# Patient Record
Sex: MALE | Race: WHITE | NOT HISPANIC OR LATINO | Employment: OTHER | URBAN - METROPOLITAN AREA
[De-identification: names, ages, dates, MRNs, and addresses within clinical notes are randomized per-mention and may not be internally consistent; named-entity substitution may affect disease eponyms.]

---

## 2023-08-27 PROBLEM — I25.10 ARTERIOSCLEROSIS OF CORONARY ARTERY: Status: ACTIVE | Noted: 2023-08-27

## 2023-08-27 PROBLEM — R79.89 OTHER SPECIFIED ABNORMAL FINDINGS OF BLOOD CHEMISTRY: Status: ACTIVE | Noted: 2023-08-27

## 2023-08-27 PROBLEM — N40.0 BENIGN PROSTATIC HYPERPLASIA: Status: ACTIVE | Noted: 2023-08-27

## 2023-08-27 PROBLEM — N18.30 STAGE 3 CHRONIC KIDNEY DISEASE (MULTI): Status: ACTIVE | Noted: 2023-08-27

## 2023-08-27 PROBLEM — I10 ESSENTIAL HYPERTENSION: Status: ACTIVE | Noted: 2023-08-27

## 2023-08-27 PROBLEM — E11.9 TYPE 2 DIABETES MELLITUS WITHOUT COMPLICATION (MULTI): Status: ACTIVE | Noted: 2023-08-27

## 2023-08-27 PROBLEM — N52.9 IMPOTENCE OF ORGANIC ORIGIN: Status: ACTIVE | Noted: 2023-08-27

## 2023-08-27 PROBLEM — E78.00 HYPERCHOLESTEROLEMIA: Status: ACTIVE | Noted: 2023-08-27

## 2023-08-27 PROBLEM — R97.20 ELEVATED PSA: Status: ACTIVE | Noted: 2023-08-27

## 2023-08-27 RX ORDER — AMLODIPINE BESYLATE 5 MG/1
5 TABLET ORAL NIGHTLY
COMMUNITY
End: 2024-02-05

## 2023-08-27 RX ORDER — OLMESARTAN MEDOXOMIL 40 MG/1
40 TABLET ORAL NIGHTLY
COMMUNITY
Start: 2020-03-24 | End: 2024-01-25

## 2023-08-27 RX ORDER — NAPROXEN SODIUM 220 MG/1
81 TABLET, FILM COATED ORAL NIGHTLY
COMMUNITY

## 2023-08-27 RX ORDER — ROSUVASTATIN CALCIUM 20 MG/1
20 TABLET, COATED ORAL NIGHTLY
COMMUNITY
Start: 2019-05-23 | End: 2024-01-25

## 2023-08-27 RX ORDER — SILDENAFIL 100 MG/1
50 TABLET, FILM COATED ORAL DAILY PRN
COMMUNITY
Start: 2018-10-15 | End: 2023-10-31 | Stop reason: ALTCHOICE

## 2023-08-27 RX ORDER — METFORMIN HYDROCHLORIDE 500 MG/1
1000 TABLET, EXTENDED RELEASE ORAL DAILY
COMMUNITY
Start: 2019-11-07 | End: 2024-01-22

## 2023-10-20 ENCOUNTER — LAB (OUTPATIENT)
Dept: LAB | Facility: LAB | Age: 68
End: 2023-10-20
Payer: MEDICARE

## 2023-10-20 DIAGNOSIS — D64.9 ANEMIA, UNSPECIFIED: ICD-10-CM

## 2023-10-20 DIAGNOSIS — E11.9 TYPE 2 DIABETES MELLITUS WITHOUT COMPLICATIONS (MULTI): ICD-10-CM

## 2023-10-20 DIAGNOSIS — I25.10 ATHEROSCLEROTIC HEART DISEASE OF NATIVE CORONARY ARTERY WITHOUT ANGINA PECTORIS: Primary | ICD-10-CM

## 2023-10-20 LAB
ALBUMIN SERPL-MCNC: 4.6 G/DL (ref 3.5–5)
ALP BLD-CCNC: 103 U/L (ref 35–125)
ALT SERPL-CCNC: 22 U/L (ref 5–40)
ANION GAP SERPL CALC-SCNC: 12 MMOL/L
AST SERPL-CCNC: 18 U/L (ref 5–40)
BILIRUB SERPL-MCNC: 0.6 MG/DL (ref 0.1–1.2)
BUN SERPL-MCNC: 25 MG/DL (ref 8–25)
CALCIUM SERPL-MCNC: 9.8 MG/DL (ref 8.5–10.4)
CHLORIDE SERPL-SCNC: 102 MMOL/L (ref 97–107)
CHOLEST SERPL-MCNC: 157 MG/DL (ref 133–200)
CHOLEST/HDLC SERPL: 3 {RATIO}
CO2 SERPL-SCNC: 26 MMOL/L (ref 24–31)
CREAT SERPL-MCNC: 1.3 MG/DL (ref 0.4–1.6)
ERYTHROCYTE [DISTWIDTH] IN BLOOD BY AUTOMATED COUNT: 12.8 % (ref 11.5–14.5)
EST. AVERAGE GLUCOSE BLD GHB EST-MCNC: 146 MG/DL
FERRITIN SERPL-MCNC: 59 NG/ML (ref 30–400)
GFR SERPL CREATININE-BSD FRML MDRD: 59 ML/MIN/1.73M*2
GLUCOSE SERPL-MCNC: 136 MG/DL (ref 65–99)
HBA1C MFR BLD: 6.7 %
HCT VFR BLD AUTO: 42.4 % (ref 41–52)
HDLC SERPL-MCNC: 53 MG/DL
HGB BLD-MCNC: 14 G/DL (ref 13.5–17.5)
IRON SERPL-MCNC: 88 UG/DL (ref 45–160)
LDLC SERPL CALC-MCNC: 66 MG/DL (ref 65–130)
MCH RBC QN AUTO: 28.8 PG (ref 26–34)
MCHC RBC AUTO-ENTMCNC: 33 G/DL (ref 32–36)
MCV RBC AUTO: 87 FL (ref 80–100)
NRBC BLD-RTO: 0 /100 WBCS (ref 0–0)
PLATELET # BLD AUTO: 221 X10*3/UL (ref 150–450)
PMV BLD AUTO: 10.7 FL (ref 7.5–11.5)
POTASSIUM SERPL-SCNC: 4.9 MMOL/L (ref 3.4–5.1)
PROT SERPL-MCNC: 7.3 G/DL (ref 5.9–7.9)
RBC # BLD AUTO: 4.86 X10*6/UL (ref 4.5–5.9)
SODIUM SERPL-SCNC: 140 MMOL/L (ref 133–145)
TRIGL SERPL-MCNC: 190 MG/DL (ref 40–150)
VIT B12 SERPL-MCNC: 413 PG/ML (ref 211–946)
WBC # BLD AUTO: 5.5 X10*3/UL (ref 4.4–11.3)

## 2023-10-20 PROCEDURE — 82607 VITAMIN B-12: CPT

## 2023-10-20 PROCEDURE — 36415 COLL VENOUS BLD VENIPUNCTURE: CPT

## 2023-10-20 PROCEDURE — 82728 ASSAY OF FERRITIN: CPT

## 2023-10-20 PROCEDURE — 80061 LIPID PANEL: CPT

## 2023-10-20 PROCEDURE — 83036 HEMOGLOBIN GLYCOSYLATED A1C: CPT

## 2023-10-20 PROCEDURE — 83540 ASSAY OF IRON: CPT

## 2023-10-20 PROCEDURE — 80053 COMPREHEN METABOLIC PANEL: CPT

## 2023-10-20 PROCEDURE — 85027 COMPLETE CBC AUTOMATED: CPT

## 2023-10-31 ENCOUNTER — OFFICE VISIT (OUTPATIENT)
Dept: PRIMARY CARE | Facility: CLINIC | Age: 68
End: 2023-10-31
Payer: MEDICARE

## 2023-10-31 VITALS
WEIGHT: 221 LBS | HEIGHT: 70 IN | SYSTOLIC BLOOD PRESSURE: 132 MMHG | OXYGEN SATURATION: 96 % | DIASTOLIC BLOOD PRESSURE: 82 MMHG | TEMPERATURE: 97.8 F | BODY MASS INDEX: 31.64 KG/M2 | HEART RATE: 68 BPM

## 2023-10-31 DIAGNOSIS — E78.00 HYPERCHOLESTEROLEMIA: ICD-10-CM

## 2023-10-31 DIAGNOSIS — I10 ESSENTIAL HYPERTENSION: ICD-10-CM

## 2023-10-31 DIAGNOSIS — I25.10 ARTERIOSCLEROSIS OF CORONARY ARTERY: Primary | ICD-10-CM

## 2023-10-31 DIAGNOSIS — E11.9 TYPE 2 DIABETES MELLITUS WITHOUT COMPLICATION, WITHOUT LONG-TERM CURRENT USE OF INSULIN (MULTI): ICD-10-CM

## 2023-10-31 DIAGNOSIS — Z85.46 HISTORY OF PROSTATE CANCER: ICD-10-CM

## 2023-10-31 PROBLEM — E66.9 OBESITY, CLASS I, BMI 30-34.9: Status: ACTIVE | Noted: 2023-08-30

## 2023-10-31 PROBLEM — C61 MALIGNANT NEOPLASM OF PROSTATE (MULTI): Status: ACTIVE | Noted: 2023-08-25

## 2023-10-31 PROBLEM — M25.562 ACUTE PAIN OF LEFT KNEE: Status: ACTIVE | Noted: 2021-08-30

## 2023-10-31 PROBLEM — H25.13 AGE-RELATED NUCLEAR CATARACT, BILATERAL: Status: ACTIVE | Noted: 2023-10-31

## 2023-10-31 PROBLEM — S83.242A TEAR OF MEDIAL MENISCUS OF LEFT KNEE, CURRENT: Status: ACTIVE | Noted: 2021-08-30

## 2023-10-31 PROBLEM — E66.811 OBESITY, CLASS I, BMI 30-34.9: Status: ACTIVE | Noted: 2023-08-30

## 2023-10-31 PROBLEM — H35.3131 NONEXUDATIVE AGE-RELATED MACULAR DEGENERATION, BILATERAL, EARLY DRY STAGE: Status: ACTIVE | Noted: 2023-10-31

## 2023-10-31 PROBLEM — H52.7 DISORDER OF REFRACTION: Status: ACTIVE | Noted: 2023-10-31

## 2023-10-31 PROCEDURE — 3079F DIAST BP 80-89 MM HG: CPT | Performed by: INTERNAL MEDICINE

## 2023-10-31 PROCEDURE — 1036F TOBACCO NON-USER: CPT | Performed by: INTERNAL MEDICINE

## 2023-10-31 PROCEDURE — 3048F LDL-C <100 MG/DL: CPT | Performed by: INTERNAL MEDICINE

## 2023-10-31 PROCEDURE — 99214 OFFICE O/P EST MOD 30 MIN: CPT | Performed by: INTERNAL MEDICINE

## 2023-10-31 PROCEDURE — 1159F MED LIST DOCD IN RCRD: CPT | Performed by: INTERNAL MEDICINE

## 2023-10-31 PROCEDURE — 1126F AMNT PAIN NOTED NONE PRSNT: CPT | Performed by: INTERNAL MEDICINE

## 2023-10-31 PROCEDURE — 3044F HG A1C LEVEL LT 7.0%: CPT | Performed by: INTERNAL MEDICINE

## 2023-10-31 PROCEDURE — 3075F SYST BP GE 130 - 139MM HG: CPT | Performed by: INTERNAL MEDICINE

## 2023-10-31 PROCEDURE — 4010F ACE/ARB THERAPY RXD/TAKEN: CPT | Performed by: INTERNAL MEDICINE

## 2023-10-31 RX ORDER — DOCUSATE SODIUM 100 MG/1
100 CAPSULE, LIQUID FILLED ORAL 2 TIMES DAILY
COMMUNITY
Start: 2023-09-12 | End: 2023-10-31 | Stop reason: ALTCHOICE

## 2023-10-31 RX ORDER — APIXABAN 2.5 MG/1
2.5 TABLET, FILM COATED ORAL 2 TIMES DAILY
COMMUNITY
Start: 2023-09-12 | End: 2023-10-03 | Stop reason: WASHOUT

## 2023-10-31 RX ORDER — TADALAFIL 5 MG/1
5 TABLET ORAL
COMMUNITY
Start: 2023-09-29

## 2023-10-31 ASSESSMENT — ENCOUNTER SYMPTOMS
PALPITATIONS: 0
OCCASIONAL FEELINGS OF UNSTEADINESS: 0
DEPRESSION: 0
SHORTNESS OF BREATH: 0

## 2023-10-31 ASSESSMENT — PATIENT HEALTH QUESTIONNAIRE - PHQ9
2. FEELING DOWN, DEPRESSED OR HOPELESS: NOT AT ALL
1. LITTLE INTEREST OR PLEASURE IN DOING THINGS: NOT AT ALL
SUM OF ALL RESPONSES TO PHQ9 QUESTIONS 1 AND 2: 0

## 2023-10-31 ASSESSMENT — PAIN SCALES - GENERAL: PAINLEVEL: 0-NO PAIN

## 2023-10-31 NOTE — PATIENT INSTRUCTIONS
Get labs 1 week follow up in spring      Diagnoses and all orders for this visit:  Arteriosclerosis of coronary artery  Comments:  LDL 66 doing well.  Essential hypertension  Comments:  BP doing well.  Hypercholesterolemia  -     Lipid Panel; Future  -     Comprehensive Metabolic Panel; Future  Type 2 diabetes mellitus without complication, without long-term current use of insulin (CMS/[facility])  Comments:  Eye exam 8/23, HGA1C 6.7%.  Orders:  -     Hemoglobin A1C; Future  -     CBC; Future  -     Albumin , Urine Random; Future  History of prostate cancer  Comments:  Recovering from prostate removal. Low dose tadalif. Drinking 48 oz water daily plus meal fluids to prevetn any dizziness.

## 2023-10-31 NOTE — PROGRESS NOTES
[facility]: [facility]  PROGRESS NOTE      Pro Fernandes is a 68 y.o. male that is presenting today for 4 mo fu.    Assessment/Plan   Diagnoses and all orders for this visit:  Arteriosclerosis of coronary artery  Comments:  LDL 66 doing well.  Essential hypertension  Comments:  BP doing well.  Hypercholesterolemia  -     Lipid Panel; Future  -     Comprehensive Metabolic Panel; Future  Type 2 diabetes mellitus without complication, without long-term current use of insulin (CMS/[facility])  Comments:  Eye exam 8/23, HGA1C 6.7%.  Orders:  -     Hemoglobin A1C; Future  -     CBC; Future  -     Albumin , Urine Random; Future  History of prostate cancer  Comments:  Recovering from prostate removal. Low dose tadalif. Drinking 48 oz water daily plus meal fluids to prevetn any dizziness.    Subjective   TN, chol, DM.Prostate removed. Doing better from 9/23. Mild incontince      Review of Systems   Respiratory:  Negative for shortness of breath.    Cardiovascular:  Negative for chest pain and palpitations.   All other systems reviewed and are negative.     Objective   Vitals:    10/31/23 1022   BP: 132/82   Pulse: 68   Temp: 36.6 °C (97.8 °F)   SpO2: 96%      Body mass index is 31.71 kg/m².  Physical Exam  Constitutional:       General: He is not in acute distress.     Appearance: He is obese.   HENT:      Head: Normocephalic and atraumatic.      Right Ear: Tympanic membrane normal.      Left Ear: Tympanic membrane normal.      Mouth/Throat:      Mouth: Mucous membranes are moist.      Pharynx: Oropharynx is clear.   Eyes:      Extraocular Movements: Extraocular movements intact.      Conjunctiva/sclera: Conjunctivae normal.      Pupils: Pupils are equal, round, and reactive to light.   Cardiovascular:      Rate and Rhythm: Normal rate and regular rhythm.   Pulmonary:      Breath sounds: Normal breath sounds.   Abdominal:      General: Bowel sounds are normal.      Palpations: Abdomen is soft. There is no  "mass.   Musculoskeletal:         General: Normal range of motion.      Cervical back: Neck supple. No tenderness.   Skin:     General: Skin is warm and dry.   Neurological:      General: No focal deficit present.      Mental Status: He is oriented to person, place, and time.      Sensory: No sensory deficit.      Motor: No weakness.       Diagnostic Results   Lab Results   Component Value Date    GLUCOSE 136 (H) 10/20/2023    CALCIUM 9.8 10/20/2023     10/20/2023    K 4.9 10/20/2023    CO2 26 10/20/2023     10/20/2023    BUN 25 10/20/2023    CREATININE 1.30 10/20/2023     Lab Results   Component Value Date    ALT 22 10/20/2023    AST 18 10/20/2023    ALKPHOS 103 10/20/2023    BILITOT 0.6 10/20/2023     Lab Results   Component Value Date    WBC 5.5 10/20/2023    HGB 14.0 10/20/2023    HCT 42.4 10/20/2023    MCV 87 10/20/2023     10/20/2023     Lab Results   Component Value Date    CHOL 157 10/20/2023    CHOL 118 (L) 10/26/2022    CHOL 149 11/04/2021     Lab Results   Component Value Date    HDL 53.0 10/20/2023    HDL 46 10/26/2022    HDL 51 11/04/2021     Lab Results   Component Value Date    LDLCALC 66 10/20/2023    LDLCALC 51 (L) 10/26/2022    LDLCALC 68 11/04/2021     Lab Results   Component Value Date    TRIG 190 (H) 10/20/2023    TRIG 106 10/26/2022    TRIG 152 (H) 11/04/2021     No components found for: \"CHOLHDL\"  Lab Results   Component Value Date    HGBA1C 6.7 (H) 10/20/2023     Other labs not included in the list above were reviewed either before or during this encounter.    History    Past Medical History:   Diagnosis Date    Arteriosclerosis of coronary artery 08/27/2023    Essential hypertension 08/27/2023    History of prostate cancer 10/31/2023    9/11/23 prostate removed, DR.Jeff Malin.    Hypercholesterolemia 08/27/2023     Past Surgical History:   Procedure Laterality Date    PROSTATE SURGERY       Family History   Problem Relation Name Age of Onset    Mental illness Mother      " Mental illness Father       Social History     Socioeconomic History    Marital status:      Spouse name: Not on file    Number of children: Not on file    Years of education: Not on file    Highest education level: Not on file   Occupational History    Not on file   Tobacco Use    Smoking status: Never     Passive exposure: Never    Smokeless tobacco: Never   Vaping Use    Vaping Use: Never used   Substance and Sexual Activity    Alcohol use: Yes    Drug use: Never    Sexual activity: Not on file   Other Topics Concern    Not on file   Social History Narrative    Not on file     Social Determinants of Health     Financial Resource Strain: Not on file   Food Insecurity: Not on file   Transportation Needs: Not on file   Physical Activity: Not on file   Stress: Not on file   Social Connections: Not on file   Intimate Partner Violence: Not on file   Housing Stability: Not on file     Allergies   Allergen Reactions    Atorvastatin Other     Myalgia     Fluvastatin Other     Myalgia     Lisinopril Cough     Current Outpatient Medications on File Prior to Visit   Medication Sig Dispense Refill    amLODIPine (Norvasc) 5 mg tablet Take 1 tablet (5 mg) by mouth once daily at bedtime.      aspirin 81 mg chewable tablet Chew 1 tablet (81 mg) once daily at bedtime.      metFORMIN XR (Glucophage-XR) 500 mg 24 hr tablet Take 2 tablets (1,000 mg) by mouth once daily.      olmesartan (BENIcar) 40 mg tablet Take 1 tablet (40 mg) by mouth once daily at bedtime.      rosuvastatin (Crestor) 20 mg tablet Take 1 tablet (20 mg) by mouth once daily at bedtime.      tadalafil (Cialis) 5 mg tablet Take 1 tablet (5 mg) by mouth once daily.      [DISCONTINUED] docusate sodium (Colace) 100 mg capsule Take 1 capsule (100 mg) by mouth 2 times a day.      [DISCONTINUED] Eliquis 2.5 mg tablet Take 1 tablet (2.5 mg) by mouth 2 times a day.      [DISCONTINUED] sildenafil (Viagra) 100 mg tablet Take 0.5 tablets (50 mg) by mouth once daily as  needed.       No current facility-administered medications on file prior to visit.     Immunization History   Administered Date(s) Administered    Flu vaccine (IIV4), preservative free *Check age/dose* 09/14/2017, 09/23/2019    Influenza, High Dose Seasonal, Preservative Free 09/18/2021    Influenza, Seasonal, Quadrivalent, Adjuvanted 10/02/2020, 09/30/2022    Influenza, seasonal, injectable 11/12/2010, 09/17/2011, 10/01/2012, 10/05/2013, 10/10/2014    Influenza, seasonal, injectable, preservative free 09/25/2015, 10/04/2016    Influenza, trivalent, adjuvanted 09/29/2018    Moderna SARS-CoV-2 Vaccination 02/11/2021, 03/16/2021, 11/19/2021    Pneumococcal conjugate vaccine, 13-valent (PREVNAR 13) 05/10/2018    Pneumococcal conjugate vaccine, 20-valent (PREVNAR 20) 06/09/2023    Pneumococcal polysaccharide vaccine, 23-valent, age 2 years and older (PNEUMOVAX 23) 05/23/2019    Tdap vaccine, age 7 year and older (BOOSTRIX) 05/31/2011, 05/20/2022    Zoster vaccine, recombinant, adult (SHINGRIX) 06/10/2021, 08/12/2021     Patient's medical history was reviewed and updated either before or during this encounter.    Billy Dial MD

## 2024-01-22 DIAGNOSIS — E11.9 TYPE 2 DIABETES MELLITUS WITHOUT COMPLICATION, UNSPECIFIED WHETHER LONG TERM INSULIN USE (MULTI): ICD-10-CM

## 2024-01-22 RX ORDER — METFORMIN HYDROCHLORIDE 500 MG/1
TABLET, EXTENDED RELEASE ORAL
Qty: 180 TABLET | Refills: 2 | Status: SHIPPED | OUTPATIENT
Start: 2024-01-22

## 2024-01-25 DIAGNOSIS — E78.00 HYPERCHOLESTEROLEMIA: ICD-10-CM

## 2024-01-25 DIAGNOSIS — I10 ESSENTIAL HYPERTENSION: ICD-10-CM

## 2024-01-25 RX ORDER — OLMESARTAN MEDOXOMIL 40 MG/1
TABLET ORAL
Qty: 90 TABLET | Refills: 2 | Status: SHIPPED | OUTPATIENT
Start: 2024-01-25

## 2024-01-25 RX ORDER — ROSUVASTATIN CALCIUM 20 MG/1
TABLET, COATED ORAL
Qty: 90 TABLET | Refills: 2 | Status: SHIPPED | OUTPATIENT
Start: 2024-01-25

## 2024-02-04 DIAGNOSIS — I10 ESSENTIAL HYPERTENSION: ICD-10-CM

## 2024-02-05 RX ORDER — AMLODIPINE BESYLATE 5 MG/1
5 TABLET ORAL NIGHTLY
Qty: 90 TABLET | Refills: 2 | Status: SHIPPED | OUTPATIENT
Start: 2024-02-05

## 2024-04-23 ENCOUNTER — TELEPHONE (OUTPATIENT)
Dept: PRIMARY CARE | Facility: CLINIC | Age: 69
End: 2024-04-23

## 2024-04-23 DIAGNOSIS — J06.9 VIRAL URI WITH COUGH: Primary | ICD-10-CM

## 2024-04-23 RX ORDER — BENZONATATE 100 MG/1
100 CAPSULE ORAL 3 TIMES DAILY PRN
Qty: 30 CAPSULE | Refills: 0 | Status: SHIPPED | OUTPATIENT
Start: 2024-04-23 | End: 2024-05-03

## 2024-04-23 NOTE — TELEPHONE ENCOUNTER
LV 10/31/23, NV 6/17/24    Pt states he has congestion,  dry cough, no fever, fatigue x 2 weeks.  Pt is requesting tesslan pearls to help with the dry cough, please advice    [address] [place]    Pt [phone]

## 2024-06-05 DIAGNOSIS — N52.9 ERECTILE DYSFUNCTION, UNSPECIFIED ERECTILE DYSFUNCTION TYPE: ICD-10-CM

## 2024-06-06 RX ORDER — SILDENAFIL 100 MG/1
TABLET, FILM COATED ORAL
Qty: 20 TABLET | Refills: 0 | Status: SHIPPED | OUTPATIENT
Start: 2024-06-06

## 2024-06-11 ENCOUNTER — LAB (OUTPATIENT)
Dept: LAB | Facility: LAB | Age: 69
End: 2024-06-11
Payer: MEDICARE

## 2024-06-11 DIAGNOSIS — E78.00 HYPERCHOLESTEROLEMIA: ICD-10-CM

## 2024-06-11 DIAGNOSIS — E11.9 TYPE 2 DIABETES MELLITUS WITHOUT COMPLICATION, WITHOUT LONG-TERM CURRENT USE OF INSULIN (MULTI): ICD-10-CM

## 2024-06-11 PROBLEM — Z79.85 LONG-TERM (CURRENT) USE OF INJECTABLE NON-INSULIN ANTIDIABETIC DRUGS: Status: ACTIVE | Noted: 2023-05-23

## 2024-06-11 PROBLEM — H35.3190 NONEXUDATIVE AGE-RELATED MACULAR DEGENERATION: Status: ACTIVE | Noted: 2023-10-31

## 2024-06-11 LAB
ALBUMIN SERPL-MCNC: 4.3 G/DL (ref 3.5–5)
ALP BLD-CCNC: 103 U/L (ref 35–125)
ALT SERPL-CCNC: 31 U/L (ref 5–40)
ANION GAP SERPL CALC-SCNC: 13 MMOL/L
AST SERPL-CCNC: 26 U/L (ref 5–40)
BILIRUB SERPL-MCNC: 0.5 MG/DL (ref 0.1–1.2)
BUN SERPL-MCNC: 23 MG/DL (ref 8–25)
CALCIUM SERPL-MCNC: 9.4 MG/DL (ref 8.5–10.4)
CHLORIDE SERPL-SCNC: 105 MMOL/L (ref 97–107)
CHOLEST SERPL-MCNC: 142 MG/DL (ref 133–200)
CHOLEST/HDLC SERPL: 2.7 {RATIO}
CO2 SERPL-SCNC: 25 MMOL/L (ref 24–31)
CREAT SERPL-MCNC: 1.3 MG/DL (ref 0.4–1.6)
EGFRCR SERPLBLD CKD-EPI 2021: 59 ML/MIN/1.73M*2
ERYTHROCYTE [DISTWIDTH] IN BLOOD BY AUTOMATED COUNT: 12.8 % (ref 11.5–14.5)
EST. AVERAGE GLUCOSE BLD GHB EST-MCNC: 154 MG/DL
GLUCOSE SERPL-MCNC: 151 MG/DL (ref 65–99)
HBA1C MFR BLD: 7 %
HCT VFR BLD AUTO: 42.2 % (ref 41–52)
HDLC SERPL-MCNC: 53 MG/DL
HGB BLD-MCNC: 13.7 G/DL (ref 13.5–17.5)
LDLC SERPL CALC-MCNC: 58 MG/DL (ref 65–130)
MCH RBC QN AUTO: 29 PG (ref 26–34)
MCHC RBC AUTO-ENTMCNC: 32.5 G/DL (ref 32–36)
MCV RBC AUTO: 89 FL (ref 80–100)
NRBC BLD-RTO: 0 /100 WBCS (ref 0–0)
PLATELET # BLD AUTO: 218 X10*3/UL (ref 150–450)
POTASSIUM SERPL-SCNC: 5 MMOL/L (ref 3.4–5.1)
PROT SERPL-MCNC: 7 G/DL (ref 5.9–7.9)
RBC # BLD AUTO: 4.73 X10*6/UL (ref 4.5–5.9)
SODIUM SERPL-SCNC: 143 MMOL/L (ref 133–145)
TRIGL SERPL-MCNC: 157 MG/DL (ref 40–150)
WBC # BLD AUTO: 5.3 X10*3/UL (ref 4.4–11.3)

## 2024-06-11 PROCEDURE — 83036 HEMOGLOBIN GLYCOSYLATED A1C: CPT

## 2024-06-11 PROCEDURE — 85027 COMPLETE CBC AUTOMATED: CPT

## 2024-06-11 PROCEDURE — 36415 COLL VENOUS BLD VENIPUNCTURE: CPT

## 2024-06-11 PROCEDURE — 80053 COMPREHEN METABOLIC PANEL: CPT

## 2024-06-11 PROCEDURE — 80061 LIPID PANEL: CPT

## 2024-06-17 ENCOUNTER — OFFICE VISIT (OUTPATIENT)
Dept: PRIMARY CARE | Facility: CLINIC | Age: 69
End: 2024-06-17
Payer: MEDICARE

## 2024-06-17 VITALS
HEIGHT: 70 IN | OXYGEN SATURATION: 95 % | DIASTOLIC BLOOD PRESSURE: 67 MMHG | WEIGHT: 222 LBS | SYSTOLIC BLOOD PRESSURE: 114 MMHG | BODY MASS INDEX: 31.78 KG/M2 | TEMPERATURE: 97.8 F | HEART RATE: 80 BPM

## 2024-06-17 DIAGNOSIS — E78.00 HYPERCHOLESTEROLEMIA: ICD-10-CM

## 2024-06-17 DIAGNOSIS — I25.10 ARTERIOSCLEROSIS OF CORONARY ARTERY: ICD-10-CM

## 2024-06-17 DIAGNOSIS — I10 ESSENTIAL HYPERTENSION: ICD-10-CM

## 2024-06-17 DIAGNOSIS — Z85.46 HISTORY OF MALIGNANT NEOPLASM OF PROSTATE: ICD-10-CM

## 2024-06-17 DIAGNOSIS — E11.9 TYPE 2 DIABETES MELLITUS WITHOUT COMPLICATION, WITHOUT LONG-TERM CURRENT USE OF INSULIN (MULTI): ICD-10-CM

## 2024-06-17 DIAGNOSIS — N18.31 STAGE 3A CHRONIC KIDNEY DISEASE (MULTI): ICD-10-CM

## 2024-06-17 DIAGNOSIS — Z00.00 ROUTINE GENERAL MEDICAL EXAMINATION AT HEALTH CARE FACILITY: Primary | ICD-10-CM

## 2024-06-17 PROBLEM — C61 MALIGNANT NEOPLASM OF PROSTATE (MULTI): Status: RESOLVED | Noted: 2023-08-25 | Resolved: 2024-06-17

## 2024-06-17 PROCEDURE — 99215 OFFICE O/P EST HI 40 MIN: CPT | Performed by: INTERNAL MEDICINE

## 2024-06-17 PROCEDURE — G0439 PPPS, SUBSEQ VISIT: HCPCS | Performed by: INTERNAL MEDICINE

## 2024-06-17 PROCEDURE — 3048F LDL-C <100 MG/DL: CPT | Performed by: INTERNAL MEDICINE

## 2024-06-17 PROCEDURE — 1159F MED LIST DOCD IN RCRD: CPT | Performed by: INTERNAL MEDICINE

## 2024-06-17 PROCEDURE — 4010F ACE/ARB THERAPY RXD/TAKEN: CPT | Performed by: INTERNAL MEDICINE

## 2024-06-17 PROCEDURE — 3078F DIAST BP <80 MM HG: CPT | Performed by: INTERNAL MEDICINE

## 2024-06-17 PROCEDURE — 1126F AMNT PAIN NOTED NONE PRSNT: CPT | Performed by: INTERNAL MEDICINE

## 2024-06-17 PROCEDURE — 3074F SYST BP LT 130 MM HG: CPT | Performed by: INTERNAL MEDICINE

## 2024-06-17 PROCEDURE — 1157F ADVNC CARE PLAN IN RCRD: CPT | Performed by: INTERNAL MEDICINE

## 2024-06-17 PROCEDURE — 3051F HG A1C>EQUAL 7.0%<8.0%: CPT | Performed by: INTERNAL MEDICINE

## 2024-06-17 RX ORDER — DAPAGLIFLOZIN 5 MG/1
5 TABLET, FILM COATED ORAL DAILY
Qty: 30 TABLET | Refills: 8 | Status: SHIPPED | OUTPATIENT
Start: 2024-06-17 | End: 2025-06-17

## 2024-06-17 ASSESSMENT — ENCOUNTER SYMPTOMS
SHORTNESS OF BREATH: 0
PALPITATIONS: 0

## 2024-06-17 ASSESSMENT — PAIN SCALES - GENERAL: PAINLEVEL: 0-NO PAIN

## 2024-06-17 ASSESSMENT — PATIENT HEALTH QUESTIONNAIRE - PHQ9
SUM OF ALL RESPONSES TO PHQ9 QUESTIONS 1 AND 2: 0
SUM OF ALL RESPONSES TO PHQ9 QUESTIONS 1 AND 2: 0
2. FEELING DOWN, DEPRESSED OR HOPELESS: NOT AT ALL
1. LITTLE INTEREST OR PLEASURE IN DOING THINGS: NOT AT ALL
1. LITTLE INTEREST OR PLEASURE IN DOING THINGS: NOT AT ALL
2. FEELING DOWN, DEPRESSED OR HOPELESS: NOT AT ALL

## 2024-06-17 NOTE — PROGRESS NOTES
[facility]: [facility]  MEDICARE WELLNESS EXAM      Pro Frenandes is a 69 y.o. male that is presenting today for Annual Exam (Having some vertigo symptoms lately).    Assessment/Plan    Diagnoses and all orders for this visit:  Routine general medical examination at [facility] care facility  Type 2 diabetes mellitus without complication, without long-term current use of insulin (Multi)  Comments:  Eye exam yearly,HGA1C 7% goal <7% Hx of heart disease. Add Farxiga 5mg $47 monthly, good for heart and diabetes. UTI even in men , usually well tolerated.  Orders:  -     dapagliflozin propanediol (Farxiga) 5 mg; Take 1 tablet (5 mg) by mouth once daily.  Stage 3a chronic kidney disease (Multi)  Comments:  Stable, drink 48 oz water plus meal fluids daily. Farxiga will protect kidneys as well.  Hypercholesterolemia  Comments:  LDL 58 on Rosuvastatin( uanble to tolerate atorvastatin)  Essential hypertension  Comments:  BP doing well.  History of malignant neoplasm of prostate  Comments:  Urology following  Arteriosclerosis of coronary artery  Comments:   following.  Orders:  -     dapagliflozin propanediol (Farxiga) 5 mg; Take 1 tablet (5 mg) by mouth once daily.  Other orders  -     Follow Up In Primary Care - Medicare Annual    ADVANCED CARE PLANNING  Advanced Care Planning was discussed with patient:  The patient has an active advanced care plan on file. The patient has an active surrogate decision-maker on file.  Encouraged the patient to confirm that Living Will and Healthcare Power of  (HCPoA) are accurate and up to date.  Encouraged the patient to confirm that our office be provided a copy of any documentation in the event that anything changes.    ACTIVITIES OF DAILY LIVING  Basic ADLs:  Bathing: Independent, Dressing: Independent, Toileting: Independent, Transferring: Independent, Continence: Independent, Feeding: Independent.    Instrumental ADLs:  Ability to use phone:  Independent, Shopping: Independent, Cooking: Independent, House-keeping: Independent, Laundry: Independent, Transportation: Independent, Medication Management: Independent, Finance Management: Independent.    Subjective   Wellness visit. Over all health status doing well. Diet reviewed, Mediterranean, low sugar diet suggested. No  active depression, or little interest in doing activites or hopelessness. Home safety reviewed, well light, no throw rugs,etc. No falls. Advanced directives filled out at home.  ADL, and instrumental ADL no limits doing well. Vision screen eye exam yearly, hearing screen 6 ft whisper test normal.  No cognitive decline observed. No opiod pain meds used. Some minor incontinence doing OK over all.      Review of Systems   Respiratory:  Negative for shortness of breath.    Cardiovascular:  Negative for chest pain and palpitations.   All other systems reviewed and are negative.    Objective   Vitals:    06/17/24 0918   BP: 114/67   Pulse: 80   Temp: 36.6 °C (97.8 °F)   SpO2: 95%      Body mass index is 31.85 kg/m².  Physical Exam  Constitutional:       General: He is not in acute distress.     Appearance: He is obese. He is not toxic-appearing.   HENT:      Head: Normocephalic and atraumatic.      Right Ear: Tympanic membrane and ear canal normal.      Left Ear: Tympanic membrane and ear canal normal.      Nose: Nose normal.      Mouth/Throat:      Pharynx: Oropharynx is clear.   Eyes:      Extraocular Movements: Extraocular movements intact.      Pupils: Pupils are equal, round, and reactive to light.   Cardiovascular:      Rate and Rhythm: Normal rate and regular rhythm.      Heart sounds: Normal heart sounds. No murmur heard.  Pulmonary:      Breath sounds: Normal breath sounds.   Abdominal:      General: Bowel sounds are normal. There is no distension.      Palpations: Abdomen is soft. There is no mass.      Tenderness: There is no abdominal tenderness.   Musculoskeletal:         General:  "No swelling or tenderness.      Right lower leg: No edema.      Left lower leg: No edema.   Skin:     General: Skin is warm and dry.   Neurological:      General: No focal deficit present.      Mental Status: He is oriented to person, place, and time.      Sensory: No sensory deficit.      Motor: No weakness.      Deep Tendon Reflexes: Reflexes normal.       Diagnostic Results   Lab Results   Component Value Date    GLUCOSE 151 (H) 06/11/2024    CALCIUM 9.4 06/11/2024     06/11/2024    K 5.0 06/11/2024    CO2 25 06/11/2024     06/11/2024    BUN 23 06/11/2024    CREATININE 1.30 06/11/2024     Lab Results   Component Value Date    ALT 31 06/11/2024    AST 26 06/11/2024    ALKPHOS 103 06/11/2024    BILITOT 0.5 06/11/2024     Lab Results   Component Value Date    WBC 5.3 06/11/2024    HGB 13.7 06/11/2024    HCT 42.2 06/11/2024    MCV 89 06/11/2024     06/11/2024     Lab Results   Component Value Date    CHOL 142 06/11/2024    CHOL 157 10/20/2023    CHOL 118 (L) 10/26/2022     Lab Results   Component Value Date    HDL 53.0 06/11/2024    HDL 53.0 10/20/2023    HDL 46 10/26/2022     Lab Results   Component Value Date    LDLCALC 58 (L) 06/11/2024    LDLCALC 66 10/20/2023    LDLCALC 51 (L) 10/26/2022     Lab Results   Component Value Date    TRIG 157 (H) 06/11/2024    TRIG 190 (H) 10/20/2023    TRIG 106 10/26/2022     No components found for: \"CHOLHDL\"  Lab Results   Component Value Date    HGBA1C 7.0 (H) 06/11/2024     Other labs not included in the list above reviewed either before or during this encounter.    History   Past Medical History:   Diagnosis Date    Arteriosclerosis of coronary artery 08/27/2023    Arteriosclerosis of coronary artery 08/27/2023    Dr.Raj Chen.    Essential hypertension 08/27/2023    History of malignant neoplasm of prostate 10/31/2023    9/11/23 prostate removed, DR.Jeff Malin.    History of prostate cancer 10/31/2023    9/11/23 prostate removed, DR.Jeff Malin.    " Hypercholesterolemia 08/27/2023     Past Surgical History:   Procedure Laterality Date    PROSTATE SURGERY       Family History   Problem Relation Name Age of Onset    Mental illness Mother      Mental illness Father       Social History     Socioeconomic History    Marital status:      Spouse name: Not on file    Number of children: Not on file    Years of education: Not on file    Highest education level: Not on file   Occupational History    Not on file   Tobacco Use    Smoking status: Never     Passive exposure: Past    Smokeless tobacco: Never   Vaping Use    Vaping status: Never Used   Substance and Sexual Activity    Alcohol use: Yes    Drug use: Never    Sexual activity: Not on file   Other Topics Concern    Not on file   Social History Narrative    Not on file     Social Determinants of Health     Financial Resource Strain: Not on file   Food Insecurity: Not on file   Transportation Needs: Not on file   Physical Activity: Not on file   Stress: Not on file   Social Connections: Not on file   Intimate Partner Violence: Not on file   Housing Stability: Not on file     Allergies   Allergen Reactions    Atorvastatin Other     Myalgia     Fluvastatin Other     Myalgia     Lisinopril Cough     Current Outpatient Medications on File Prior to Visit   Medication Sig Dispense Refill    amLODIPine (Norvasc) 5 mg tablet TAKE 1 TABLET BY MOUTH EVERY DAY AT BEDTIME 90 tablet 2    aspirin 81 mg chewable tablet Chew 1 tablet (81 mg) once daily at bedtime.      metFORMIN  mg 24 hr tablet TAKE 2 TABLETS BY MOUTH EVERY DAY WITH EVENING MEAL 180 tablet 2    olmesartan (BENIcar) 40 mg tablet TAKE 1 TABLET BY MOUTH EVERY DAY AT BEDTIME FOR 90 DAYS 90 tablet 2    rosuvastatin (Crestor) 20 mg tablet TAKE 1 TABLET BY MOUTH EVERY DAY AT BEDTIME FOR 90 DAYS 90 tablet 2    sildenafil (Viagra) 100 mg tablet TAKE 1/2 TO 1 TABLET BY MOUTH ONCE DAILY AS NEEDED FOR UP TO 90 DAYS 20 tablet 0    [DISCONTINUED] Eliquis 2.5 mg  tablet Take 1 tablet (2.5 mg) by mouth 2 times a day.      [DISCONTINUED] tadalafil (Cialis) 5 mg tablet Take 1 tablet (5 mg) by mouth once daily.       No current facility-administered medications on file prior to visit.     Immunization History   Administered Date(s) Administered    Flu vaccine (IIV4), preservative free *Check age/dose* 09/14/2017, 09/23/2019    Flu vaccine, quadrivalent, high-dose, preservative free, age 65y+ (FLUZONE) 09/12/2023    Influenza, High Dose Seasonal, Preservative Free 09/18/2021    Influenza, Seasonal, Quadrivalent, Adjuvanted 10/02/2020, 09/30/2022    Influenza, seasonal, injectable 11/12/2010, 09/17/2011, 10/01/2012, 10/05/2013, 10/10/2014    Influenza, seasonal, injectable, preservative free 09/25/2015, 10/04/2016    Influenza, trivalent, adjuvanted 09/29/2018    Moderna SARS-CoV-2 Vaccination 02/11/2021, 03/16/2021, 11/19/2021    Pneumococcal conjugate vaccine, 13-valent (PREVNAR 13) 05/10/2018    Pneumococcal conjugate vaccine, 20-valent (PREVNAR 20) 06/09/2023    Pneumococcal polysaccharide vaccine, 23-valent, age 2 years and older (PNEUMOVAX 23) 05/23/2019    Tdap vaccine, age 7 year and older (BOOSTRIX, ADACEL) 05/31/2011, 05/20/2022    Zoster vaccine, recombinant, adult (SHINGRIX) 06/10/2021, 08/12/2021     Patient's medical history was reviewed and updated either before or during this encounter.     Billy Dial MD

## 2024-06-17 NOTE — PATIENT INSTRUCTIONS
Vanessa Quintanilla End of December between Xmas an New Year Get labs in October     Diagnoses and all orders for this visit:  Routine general medical examination at health care facility  Type 2 diabetes mellitus without complication, without long-term current use of insulin (Multi)  Comments:  Eye exam yearly,HGA1C 7% goal <7% Hx of heart disease. Add Farxiga 5mg $47 monthly, good for heart and diabetes. UTI even in men , usually well tolerated.  Orders:  -     dapagliflozin propanediol (Farxiga) 5 mg; Take 1 tablet (5 mg) by mouth once daily.  Stage 3a chronic kidney disease (Multi)  Comments:  Stable, drink 48 oz water plus meal fluids daily. Farxiga will protect kidneys as well.  Hypercholesterolemia  Comments:  LDL 58 on Rosuvastatin( uanble to tolerate atorvastatin)  Essential hypertension  Comments:  BP doing well.  History of malignant neoplasm of prostate  Comments:  Urology following  Arteriosclerosis of coronary artery  Comments:   following.  Orders:  -     dapagliflozin propanediol (Farxiga) 5 mg; Take 1 tablet (5 mg) by mouth once daily.  Other orders  -     Follow Up In Primary Care - Medicare Annual

## 2024-08-12 ENCOUNTER — APPOINTMENT (OUTPATIENT)
Dept: OPHTHALMOLOGY | Facility: CLINIC | Age: 69
End: 2024-08-12
Payer: MEDICARE

## 2024-08-20 ENCOUNTER — APPOINTMENT (OUTPATIENT)
Dept: OPHTHALMOLOGY | Facility: CLINIC | Age: 69
End: 2024-08-20
Payer: MEDICARE

## 2024-08-21 ENCOUNTER — OFFICE VISIT (OUTPATIENT)
Dept: OPHTHALMOLOGY | Facility: CLINIC | Age: 69
End: 2024-08-21
Payer: MEDICARE

## 2024-08-21 ENCOUNTER — APPOINTMENT (OUTPATIENT)
Dept: OPHTHALMOLOGY | Facility: CLINIC | Age: 69
End: 2024-08-21
Payer: MEDICARE

## 2024-08-21 DIAGNOSIS — D48.5 NEOPLASM OF UNCERTAIN BEHAVIOR OF SKIN OF EYELID: ICD-10-CM

## 2024-08-21 DIAGNOSIS — E11.9 TYPE 2 DIABETES MELLITUS WITHOUT COMPLICATION, WITHOUT LONG-TERM CURRENT USE OF INSULIN (MULTI): Primary | ICD-10-CM

## 2024-08-21 DIAGNOSIS — H35.3131 EARLY DRY STAGE NONEXUDATIVE AGE-RELATED MACULAR DEGENERATION OF BOTH EYES: ICD-10-CM

## 2024-08-21 DIAGNOSIS — H52.7 DISORDER OF REFRACTION: ICD-10-CM

## 2024-08-21 DIAGNOSIS — H25.13 AGE-RELATED NUCLEAR CATARACT OF BOTH EYES: ICD-10-CM

## 2024-08-21 PROCEDURE — 99214 OFFICE O/P EST MOD 30 MIN: CPT | Performed by: OPHTHALMOLOGY

## 2024-08-21 PROCEDURE — 92134 CPTRZ OPH DX IMG PST SGM RTA: CPT | Performed by: OPHTHALMOLOGY

## 2024-08-21 ASSESSMENT — ENCOUNTER SYMPTOMS
CARDIOVASCULAR NEGATIVE: 0
CONSTITUTIONAL NEGATIVE: 0
HEMATOLOGIC/LYMPHATIC NEGATIVE: 0
MUSCULOSKELETAL NEGATIVE: 0
EYES NEGATIVE: 0
RESPIRATORY NEGATIVE: 0
ENDOCRINE NEGATIVE: 0
NEUROLOGICAL NEGATIVE: 0
GASTROINTESTINAL NEGATIVE: 0
ALLERGIC/IMMUNOLOGIC NEGATIVE: 0
PSYCHIATRIC NEGATIVE: 0

## 2024-08-21 ASSESSMENT — KERATOMETRY
OD_K2POWER_DIOPTERS: 44.50
OD_AXISANGLE_DEGREES: 25
OD_AXISANGLE2_DEGREES: 115
OD_K1POWER_DIOPTERS: 44.00
OS_AXISANGLE_DEGREES: 90
OS_AXISANGLE2_DEGREES: 180
OS_K1POWER_DIOPTERS: 44.25
OS_K2POWER_DIOPTERS: 44.75

## 2024-08-21 ASSESSMENT — EXTERNAL EXAM - LEFT EYE: OS_EXAM: NORMAL

## 2024-08-21 ASSESSMENT — VISUAL ACUITY
OS_SC+: -1
METHOD: SNELLEN - LINEAR
OD_SC: 20/25
OS_SC: 20/25

## 2024-08-21 ASSESSMENT — CUP TO DISC RATIO
OS_RATIO: 0.1
OD_RATIO: 0.1

## 2024-08-21 ASSESSMENT — PAIN SCALES - GENERAL: PAINLEVEL: 0-NO PAIN

## 2024-08-21 ASSESSMENT — TONOMETRY
OS_IOP_MMHG: 14
OD_IOP_MMHG: 14
IOP_METHOD: GOLDMANN APPLANATION

## 2024-08-21 ASSESSMENT — REFRACTION_WEARINGRX
SPECS_TYPE: READERS
OS_SPHERE: +2.75
OD_SPHERE: +2.75

## 2024-08-21 ASSESSMENT — SLIT LAMP EXAM - LIDS: COMMENTS: NORMAL

## 2024-08-21 ASSESSMENT — EXTERNAL EXAM - RIGHT EYE: OD_EXAM: NORMAL

## 2024-08-21 NOTE — ASSESSMENT & PLAN NOTE
Discussed nature of dry age-related macular degeneration (AMD). No obvious clinical progression or on OCT macula. Doing amsler at home.

## 2024-08-21 NOTE — PATIENT INSTRUCTIONS
Thank you so much for choosing me to provide your care today!    If you were dilated your vision may remain blurry   or light sensitive for several hours.    The nature of eye and vision problems can require frequent follow up, please make every effort to adhere to any future appointments.    If you have any issues, questions, or concerns,   please do not hesitate to reach out.    If you receive a survey in regards to your care today, please mention any exceptional care my office staff and/or technicians provided.    You can reach our office at this number:  [phone]

## 2024-08-21 NOTE — PROGRESS NOTES
Assessment/Plan   Problem List Items Addressed This Visit       Type 2 diabetes mellitus without complication (Multi) - Primary     Advised no signs of diabetic changes on exam. Recommend to continue best sugar control and on need for annual checkup. Understands role of good BP control and weight loss if applicable. Discussed some components of selected studies like WESDR, DCCT, and UKPDS to describe the likely course of diabetes and effects on the eyes.           Age-related nuclear cataract of both eyes     Non significant cataract noted on exam. Will plan to continue to monitor with serial exam.            Disorder of refraction     Discussed glasses prescription from refraction. Will provide if patient interested in keeping for records or to fill as a new set of glasses.            Nonexudative age-related macular degeneration     Discussed nature of dry age-related macular degeneration (AMD). No obvious clinical progression or on OCT macula. Doing amsler at home.          Relevant Orders    OCT, Retina - OU - Both Eyes (Completed)    Neoplasm of uncertain behavior of skin of eyelid     Central lesion concerning for basal cell. Will ask for oculoplastics eval and likely biopsy            Provided reassurance regarding above diagnoses and care received in the office visit today. Discussed outcomes and options along with the importance of treatment compliance. Understands the importance of any follow up visits. Patient instructed to call/communicate with our office if any new issues, questions, or concerns.     Will plan to see back in 1 year full OCT mac or sooner PRN

## 2024-08-21 NOTE — LETTER
August 21, 2024    Vanessa Quintanilla, APRN-CNP  [address] [place] [address]  [address]  [place] [address]    Patient: Pro Fernandes   YOB: 1955   Date of Visit: 8/21/2024       Dear Dr. Vanessa Quintanilla, APRN-CNP:    Thank you for referring Pro Fernandes to me for evaluation. Here is my assessment and plan of care:    Assessment/Plan:  Pro was seen today for annual exam.  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  Disorder of refraction    Right eye:     Left eye:    [x] No diabetes mellitus (DM) retinopathy [x] No diabetes mellitus (DM) retinopathy    [] Mild non-proliferative retinopathy [] Mild non-proliferative retinopathy    [] Moderate non-proliferative retinopathy [] Moderate non-proliferative retinopathy    [] Severe non-proliferative retinopathy [] Severe non-proliferative retinopathy    [] Proliferative retinopathy   [] Proliferative retinopathy    [] Diabetic macular edema   [] Diabetic macular edema    Urged patient to continue to work on best blood sugar and blood pressure control  Advised patient to call if any new vision changes noted    Will plan to repeat evaluation in:   [] 3 months [] 6 months [] 9 months [x] 12 months [] Other    Below you can find relevant pieces of the exam. If you have questions, please do not hesitate to call me. I look forward to following Pro along with you.         Sincerely,        Ken Rucker MD        CC:   No Recipients         External Exam         Right Left    External Normal Normal              Slit Lamp Exam         Right Left    Lids/Lashes Normal Lid lesions. Multiple papillomatous growths,  "central largest on stalk. Has 1cm, heaped margin with telangiectatic growth with scabbing    Conjunctiva/Sclera White and quiet White and quiet    Cornea Clear Clear    Anterior Chamber Deep and quiet Deep and quiet    Iris Round and reactive Round and reactive    Lens 1+ nuclear sclerosis 1+ nuclear sclerosis              Fundus Exam         Right Left    Vitreous Normal vitreous clear and normal    Disc Normal Normal    C/D Ratio 0.1 0.1    Macula Intermediate soft drusen Intermediate soft drusen    Vessels Normal Normal    Periphery Normal Normal                   <div id=\"[email or web address]\"></div>     "

## 2024-09-11 PROCEDURE — 88305 TISSUE EXAM BY PATHOLOGIST: CPT | Performed by: PATHOLOGY

## 2024-09-11 PROCEDURE — 88305 TISSUE EXAM BY PATHOLOGIST: CPT

## 2024-09-13 ENCOUNTER — LAB REQUISITION (OUTPATIENT)
Dept: LAB | Facility: HOSPITAL | Age: 69
End: 2024-09-13
Payer: MEDICARE

## 2024-09-13 DIAGNOSIS — D48.5 NEOPLASM OF UNCERTAIN BEHAVIOR OF SKIN: ICD-10-CM

## 2024-09-18 LAB
LABORATORY COMMENT REPORT: NORMAL
PATH REPORT.FINAL DX SPEC: NORMAL
PATH REPORT.GROSS SPEC: NORMAL
PATH REPORT.RELEVANT HX SPEC: NORMAL
PATH REPORT.TOTAL CANCER: NORMAL

## 2024-10-13 DIAGNOSIS — I10 ESSENTIAL HYPERTENSION: ICD-10-CM

## 2024-10-13 DIAGNOSIS — E11.9 TYPE 2 DIABETES MELLITUS WITHOUT COMPLICATION, UNSPECIFIED WHETHER LONG TERM INSULIN USE (MULTI): ICD-10-CM

## 2024-10-14 RX ORDER — AMLODIPINE BESYLATE 5 MG/1
5 TABLET ORAL NIGHTLY
Qty: 90 TABLET | Refills: 1 | Status: SHIPPED | OUTPATIENT
Start: 2024-10-14

## 2024-10-14 RX ORDER — METFORMIN HYDROCHLORIDE 500 MG/1
TABLET, EXTENDED RELEASE ORAL
Qty: 180 TABLET | Refills: 1 | Status: SHIPPED | OUTPATIENT
Start: 2024-10-14

## 2024-10-16 DIAGNOSIS — E78.00 HYPERCHOLESTEROLEMIA: ICD-10-CM

## 2024-10-16 RX ORDER — ROSUVASTATIN CALCIUM 20 MG/1
20 TABLET, COATED ORAL NIGHTLY
Qty: 90 TABLET | Refills: 1 | Status: SHIPPED | OUTPATIENT
Start: 2024-10-16

## 2024-10-25 ENCOUNTER — OFFICE VISIT (OUTPATIENT)
Dept: PRIMARY CARE | Facility: CLINIC | Age: 69
End: 2024-10-25
Payer: MEDICARE

## 2024-10-25 VITALS
DIASTOLIC BLOOD PRESSURE: 75 MMHG | SYSTOLIC BLOOD PRESSURE: 129 MMHG | WEIGHT: 224 LBS | OXYGEN SATURATION: 95 % | HEIGHT: 69 IN | TEMPERATURE: 97.9 F | HEART RATE: 80 BPM | BODY MASS INDEX: 33.18 KG/M2

## 2024-10-25 DIAGNOSIS — Z01.818 PRE-OP EXAMINATION: Primary | ICD-10-CM

## 2024-10-25 PROCEDURE — 99214 OFFICE O/P EST MOD 30 MIN: CPT | Performed by: NURSE PRACTITIONER

## 2024-10-25 ASSESSMENT — ENCOUNTER SYMPTOMS
CARDIOVASCULAR NEGATIVE: 1
CONSTITUTIONAL NEGATIVE: 1
RESPIRATORY NEGATIVE: 1
GASTROINTESTINAL NEGATIVE: 1
ENDOCRINE NEGATIVE: 1
PSYCHIATRIC NEGATIVE: 1
MUSCULOSKELETAL NEGATIVE: 1

## 2024-10-25 ASSESSMENT — PATIENT HEALTH QUESTIONNAIRE - PHQ9
SUM OF ALL RESPONSES TO PHQ9 QUESTIONS 1 AND 2: 0
1. LITTLE INTEREST OR PLEASURE IN DOING THINGS: NOT AT ALL
2. FEELING DOWN, DEPRESSED OR HOPELESS: NOT AT ALL

## 2024-10-25 ASSESSMENT — PAIN SCALES - GENERAL: PAINLEVEL_OUTOF10: 0-NO PAIN

## 2024-10-25 NOTE — PROGRESS NOTES
[facility]: [facility]  PHYSICAL EXAM      Pro Fernandes is a 69 y.o. male that is presenting today for No chief complaint on file..    Assessment/Plan     Subjective   This is a 68 y/o male here today for pre-operative exam.  Patient is scheduled for MOHS procedure left lower eyelid w/ reconstruction for basal cell carcinoma.  The patient has a PMH of DM II, CKD 3a, HTN, Hypercholesterolemia, Prostate Cancer, Arteriosclerosis of Coronary Artery s/p stent in 2015        Review of Systems   Constitutional: Negative.    HENT: Negative.          Basal cell carcinoma under left eye   Eyes:         Basal cell carcinoma under left eye   Respiratory: Negative.     Cardiovascular: Negative.    Gastrointestinal: Negative.    Endocrine: Negative.    Genitourinary: Negative.    Musculoskeletal: Negative.    Skin: Negative.    Neurological:         Intermittent vertigo   Psychiatric/Behavioral: Negative.        Objective   There were no vitals filed for this visit.  There is no height or weight on file to calculate BMI.  Physical Exam  Constitutional:       Appearance: Normal appearance.   HENT:      Head: Normocephalic and atraumatic.      Mouth/Throat:      Mouth: Mucous membranes are moist.      Pharynx: Oropharynx is clear.   Eyes:      Extraocular Movements: Extraocular movements intact.      Conjunctiva/sclera: Conjunctivae normal.      Pupils: Pupils are equal, round, and reactive to light.   Cardiovascular:      Rate and Rhythm: Normal rate and regular rhythm.      Pulses: Normal pulses.      Heart sounds: Normal heart sounds.   Pulmonary:      Effort: Pulmonary effort is normal.      Breath sounds: Normal breath sounds.   Abdominal:      General: Bowel sounds are normal.   Musculoskeletal:         General: Normal range of motion.      Cervical back: Normal range of motion and neck supple.   Skin:     General: Skin is warm and dry.   Neurological:      General: No focal deficit present.       "Mental Status: He is alert and oriented to person, place, and time.   Psychiatric:         Mood and Affect: Mood normal.         Behavior: Behavior normal.       Diagnostic Results   Lab Results   Component Value Date    GLUCOSE 151 (H) 06/11/2024    CALCIUM 9.4 06/11/2024     06/11/2024    K 5.0 06/11/2024    CO2 25 06/11/2024     06/11/2024    BUN 23 06/11/2024    CREATININE 1.30 06/11/2024     Lab Results   Component Value Date    ALT 31 06/11/2024    AST 26 06/11/2024    ALKPHOS 103 06/11/2024    BILITOT 0.5 06/11/2024     Lab Results   Component Value Date    WBC 5.3 06/11/2024    HGB 13.7 06/11/2024    HCT 42.2 06/11/2024    MCV 89 06/11/2024     06/11/2024     Lab Results   Component Value Date    CHOL 142 06/11/2024    CHOL 157 10/20/2023    CHOL 118 (L) 10/26/2022     Lab Results   Component Value Date    HDL 53.0 06/11/2024    HDL 53.0 10/20/2023    HDL 46 10/26/2022     Lab Results   Component Value Date    LDLCALC 58 (L) 06/11/2024    LDLCALC 66 10/20/2023    LDLCALC 51 (L) 10/26/2022     Lab Results   Component Value Date    TRIG 157 (H) 06/11/2024    TRIG 190 (H) 10/20/2023    TRIG 106 10/26/2022     No components found for: \"CHOLHDL\"  Lab Results   Component Value Date    HGBA1C 7.0 (H) 06/11/2024     Other labs not included in the list above were reviewed either before or during this encounter.    History   Past Medical History:   Diagnosis Date    Age-related nuclear cataract, bilateral     Arteriosclerosis of coronary artery 08/27/2023    Arteriosclerosis of coronary artery 08/27/2023    Dr.Raj Chen.    Dry eyes     Essential hypertension 08/27/2023    History of malignant neoplasm of prostate 10/31/2023    9/11/23 prostate removed, DR.Jeff Malin.    History of prostate cancer 10/31/2023    9/11/23 prostate removed, DR.Jeff Malin.    Hypercholesterolemia 08/27/2023    Refractive error      Past Surgical History:   Procedure Laterality Date    PROSTATE SURGERY       Family " History   Problem Relation Name Age of Onset    Mental illness Mother Giselle Fernandes     Stroke Mother Giselle Fernandes     Mental illness Father Crow Fernandes     Cancer Father Crow Fernandes     Cancer Brother Jeremiah Fernandes      Social History     Socioeconomic History    Marital status:      Spouse name: Not on file    Number of children: Not on file    Years of education: Not on file    Highest education level: Not on file   Occupational History    Not on file   Tobacco Use    Smoking status: Never     Passive exposure: Past    Smokeless tobacco: Never   Vaping Use    Vaping status: Never Used   Substance and Sexual Activity    Alcohol use: Yes    Drug use: Never    Sexual activity: Not Currently   Other Topics Concern    Not on file   Social History Narrative    Not on file     Social Drivers of Health     Financial Resource Strain: Not on file   Food Insecurity: Not on file   Transportation Needs: Not on file   Physical Activity: Not on file   Stress: Not on file   Social Connections: Not on file   Intimate Partner Violence: Not on file   Housing Stability: Not on file     Allergies   Allergen Reactions    Atorvastatin Other     Myalgia     Fluvastatin Other     Myalgia     Lisinopril Cough     Current Outpatient Medications on File Prior to Visit   Medication Sig Dispense Refill    amLODIPine (Norvasc) 5 mg tablet TAKE 1 TABLET BY MOUTH EVERYDAY AT BEDTIME 90 tablet 1    aspirin 81 mg chewable tablet Chew 1 tablet (81 mg) once daily at bedtime.      dapagliflozin propanediol (Farxiga) 5 mg Take 1 tablet (5 mg) by mouth once daily. 30 tablet 8    metFORMIN  mg 24 hr tablet TAKE 2 TABLETS BY MOUTH EVERY DAY WITH EVENING MEAL 180 tablet 1    olmesartan (BENIcar) 40 mg tablet TAKE 1 TABLET BY MOUTH EVERY DAY AT BEDTIME FOR 90 DAYS 90 tablet 2    rosuvastatin (Crestor) 20 mg tablet TAKE 1 TABLET BY MOUTH EVERY DAY AT BEDTIME 90 tablet 1    sildenafil (Viagra) 100 mg tablet TAKE 1/2 TO 1 TABLET BY MOUTH ONCE  DAILY AS NEEDED FOR UP TO 90 DAYS 20 tablet 0    [DISCONTINUED] Eliquis 2.5 mg tablet Take 1 tablet (2.5 mg) by mouth 2 times a day.       No current facility-administered medications on file prior to visit.     Immunization History   Administered Date(s) Administered    Flu vaccine (IIV4), preservative free *Check age/dose* 09/14/2017, 09/23/2019    Flu vaccine, quadrivalent, high-dose, preservative free, age 65y+ (FLUZONE) 09/12/2023    Flu vaccine, trivalent, preservative free, HIGH-DOSE, age 65y+ (Fluzone) 09/18/2021    Flu vaccine, trivalent, preservative free, age 6 months and greater (Fluarix/Fluzone/Flulaval) 09/25/2015, 10/04/2016    Influenza, Seasonal, Quadrivalent, Adjuvanted 10/02/2020, 09/30/2022    Influenza, seasonal, injectable 11/12/2010, 09/17/2011, 10/01/2012, 10/05/2013, 10/10/2014    Influenza, trivalent, adjuvanted 09/29/2018    Moderna SARS-CoV-2 Vaccination 02/11/2021, 03/16/2021, 11/19/2021    Pneumococcal conjugate vaccine, 13-valent (PREVNAR 13) 05/10/2018    Pneumococcal conjugate vaccine, 20-valent (PREVNAR 20) 06/09/2023    Pneumococcal polysaccharide vaccine, 23-valent, age 2 years and older (PNEUMOVAX 23) 05/23/2019    Tdap vaccine, age 7 year and older (BOOSTRIX, ADACEL) 05/31/2011, 05/20/2022    Zoster vaccine, recombinant, adult (SHINGRIX) 06/10/2021, 08/12/2021     Patient's medical history was reviewed and updated either before or during this encounter.      Assessment/Plan   Pre operative clearance exam  RCRI 1 point 6% risk   Ariscat 3 points low risk for post-op complications  DASI  50.7 points 8.98 mets  Lori 0.5%  Mallampati IV  Mouth opening 3  Full rom to cervical spine    Patient is low risk for MOHS procedure - will need to get cardiac clearance d/t history of CAD w/ stent in 2015    Diabetes Mellitus II  Last A1C 7.0   Managed with metformin 500mg daily    CKD 3b  Managed with farxiga 5mg daily  Last bun & Creat on 6/11/2024 23 & 1.30 GFR 59    Hypertension  Manged  with olmesartan 40mg daily    Hypercholesterolemia  Managed with crestor 20mg daily    Prostate cancer  Last PSA <0.02  Follow up with urology as scheduled    Arteriosclerosis of Coronary Artery  Stable  Follow up with cardiology as scheduled    Follow up as scheduled in January       ELISABET Bae-CNP

## 2024-12-23 ENCOUNTER — LAB (OUTPATIENT)
Dept: LAB | Facility: LAB | Age: 69
End: 2024-12-23
Payer: MEDICARE

## 2024-12-23 DIAGNOSIS — E78.00 HYPERCHOLESTEROLEMIA: ICD-10-CM

## 2024-12-23 DIAGNOSIS — E11.9 TYPE 2 DIABETES MELLITUS WITHOUT COMPLICATION, WITHOUT LONG-TERM CURRENT USE OF INSULIN (MULTI): ICD-10-CM

## 2024-12-23 LAB
ALBUMIN SERPL BCP-MCNC: 4.3 G/DL (ref 3.4–5)
ALP SERPL-CCNC: 86 U/L (ref 33–136)
ALT SERPL W P-5'-P-CCNC: 30 U/L (ref 10–52)
ANION GAP SERPL CALCULATED.3IONS-SCNC: 10 MMOL/L (ref 10–20)
AST SERPL W P-5'-P-CCNC: 20 U/L (ref 9–39)
BASOPHILS # BLD AUTO: 0.03 X10*3/UL (ref 0–0.1)
BASOPHILS NFR BLD AUTO: 0.6 %
BILIRUB SERPL-MCNC: 0.5 MG/DL (ref 0–1.2)
BUN SERPL-MCNC: 19 MG/DL (ref 6–23)
CALCIUM SERPL-MCNC: 9.3 MG/DL (ref 8.6–10.3)
CHLORIDE SERPL-SCNC: 106 MMOL/L (ref 98–107)
CHOLEST SERPL-MCNC: 142 MG/DL (ref 0–199)
CHOLEST/HDLC SERPL: 2.7 {RATIO}
CO2 SERPL-SCNC: 29 MMOL/L (ref 21–32)
CREAT SERPL-MCNC: 1.25 MG/DL (ref 0.5–1.3)
EGFRCR SERPLBLD CKD-EPI 2021: 62 ML/MIN/1.73M*2
EOSINOPHIL # BLD AUTO: 0.12 X10*3/UL (ref 0–0.4)
EOSINOPHIL NFR BLD AUTO: 2.3 %
ERYTHROCYTE [DISTWIDTH] IN BLOOD BY AUTOMATED COUNT: 12.9 % (ref 11.5–14.5)
EST. AVERAGE GLUCOSE BLD GHB EST-MCNC: 151 MG/DL
GLUCOSE SERPL-MCNC: 155 MG/DL (ref 74–99)
HBA1C MFR BLD: 6.9 %
HCT VFR BLD AUTO: 43.4 % (ref 41–52)
HDLC SERPL-MCNC: 51.7 MG/DL
HGB BLD-MCNC: 14.1 G/DL (ref 13.5–17.5)
IMM GRANULOCYTES # BLD AUTO: 0.01 X10*3/UL (ref 0–0.5)
IMM GRANULOCYTES NFR BLD AUTO: 0.2 % (ref 0–0.9)
LDLC SERPL CALC-MCNC: 58 MG/DL
LYMPHOCYTES # BLD AUTO: 1.39 X10*3/UL (ref 0.8–3)
LYMPHOCYTES NFR BLD AUTO: 26.6 %
MCH RBC QN AUTO: 29.1 PG (ref 26–34)
MCHC RBC AUTO-ENTMCNC: 32.5 G/DL (ref 32–36)
MCV RBC AUTO: 90 FL (ref 80–100)
MONOCYTES # BLD AUTO: 0.33 X10*3/UL (ref 0.05–0.8)
MONOCYTES NFR BLD AUTO: 6.3 %
NEUTROPHILS # BLD AUTO: 3.34 X10*3/UL (ref 1.6–5.5)
NEUTROPHILS NFR BLD AUTO: 64 %
NON HDL CHOLESTEROL: 90 MG/DL (ref 0–149)
NRBC BLD-RTO: 0 /100 WBCS (ref 0–0)
PLATELET # BLD AUTO: 212 X10*3/UL (ref 150–450)
POTASSIUM SERPL-SCNC: 4.2 MMOL/L (ref 3.5–5.3)
PROT SERPL-MCNC: 6.9 G/DL (ref 6.4–8.2)
RBC # BLD AUTO: 4.84 X10*6/UL (ref 4.5–5.9)
SODIUM SERPL-SCNC: 141 MMOL/L (ref 136–145)
TRIGL SERPL-MCNC: 162 MG/DL (ref 0–149)
VIT B12 SERPL-MCNC: 298 PG/ML (ref 211–911)
VLDL: 32 MG/DL (ref 0–40)
WBC # BLD AUTO: 5.2 X10*3/UL (ref 4.4–11.3)

## 2024-12-23 PROCEDURE — 85025 COMPLETE CBC W/AUTO DIFF WBC: CPT

## 2024-12-23 PROCEDURE — 80053 COMPREHEN METABOLIC PANEL: CPT

## 2024-12-23 PROCEDURE — 80061 LIPID PANEL: CPT

## 2024-12-23 PROCEDURE — 36415 COLL VENOUS BLD VENIPUNCTURE: CPT

## 2024-12-23 PROCEDURE — 83036 HEMOGLOBIN GLYCOSYLATED A1C: CPT

## 2024-12-23 PROCEDURE — 82607 VITAMIN B-12: CPT

## 2024-12-31 DIAGNOSIS — E78.00 HYPERCHOLESTEROLEMIA: ICD-10-CM

## 2024-12-31 DIAGNOSIS — E11.9 TYPE 2 DIABETES MELLITUS WITHOUT COMPLICATION, UNSPECIFIED WHETHER LONG TERM INSULIN USE (MULTI): ICD-10-CM

## 2025-01-02 ENCOUNTER — OFFICE VISIT (OUTPATIENT)
Dept: PRIMARY CARE | Facility: CLINIC | Age: 70
End: 2025-01-02
Payer: MEDICARE

## 2025-01-02 VITALS
OXYGEN SATURATION: 96 % | WEIGHT: 222 LBS | HEART RATE: 79 BPM | SYSTOLIC BLOOD PRESSURE: 126 MMHG | TEMPERATURE: 97.6 F | DIASTOLIC BLOOD PRESSURE: 82 MMHG | HEIGHT: 69 IN | BODY MASS INDEX: 32.88 KG/M2

## 2025-01-02 DIAGNOSIS — E11.9 TYPE 2 DIABETES MELLITUS WITHOUT COMPLICATION, WITHOUT LONG-TERM CURRENT USE OF INSULIN (MULTI): Primary | ICD-10-CM

## 2025-01-02 DIAGNOSIS — I10 ESSENTIAL HYPERTENSION: ICD-10-CM

## 2025-01-02 DIAGNOSIS — E78.00 HYPERCHOLESTEROLEMIA: ICD-10-CM

## 2025-01-02 PROBLEM — R79.89 OTHER SPECIFIED ABNORMAL FINDINGS OF BLOOD CHEMISTRY: Status: RESOLVED | Noted: 2023-08-27 | Resolved: 2025-01-02

## 2025-01-02 PROBLEM — Z00.00 ROUTINE GENERAL MEDICAL EXAMINATION AT HEALTH CARE FACILITY: Status: RESOLVED | Noted: 2024-06-17 | Resolved: 2025-01-02

## 2025-01-02 PROCEDURE — 1160F RVW MEDS BY RX/DR IN RCRD: CPT | Performed by: NURSE PRACTITIONER

## 2025-01-02 PROCEDURE — 4010F ACE/ARB THERAPY RXD/TAKEN: CPT | Performed by: NURSE PRACTITIONER

## 2025-01-02 PROCEDURE — 1036F TOBACCO NON-USER: CPT | Performed by: NURSE PRACTITIONER

## 2025-01-02 PROCEDURE — 3079F DIAST BP 80-89 MM HG: CPT | Performed by: NURSE PRACTITIONER

## 2025-01-02 PROCEDURE — 3008F BODY MASS INDEX DOCD: CPT | Performed by: NURSE PRACTITIONER

## 2025-01-02 PROCEDURE — 3074F SYST BP LT 130 MM HG: CPT | Performed by: NURSE PRACTITIONER

## 2025-01-02 PROCEDURE — 1157F ADVNC CARE PLAN IN RCRD: CPT | Performed by: NURSE PRACTITIONER

## 2025-01-02 PROCEDURE — 1126F AMNT PAIN NOTED NONE PRSNT: CPT | Performed by: NURSE PRACTITIONER

## 2025-01-02 PROCEDURE — 1159F MED LIST DOCD IN RCRD: CPT | Performed by: NURSE PRACTITIONER

## 2025-01-02 PROCEDURE — 99214 OFFICE O/P EST MOD 30 MIN: CPT | Performed by: NURSE PRACTITIONER

## 2025-01-02 RX ORDER — METFORMIN HYDROCHLORIDE 500 MG/1
TABLET, EXTENDED RELEASE ORAL
Qty: 180 TABLET | Refills: 1 | Status: SHIPPED | OUTPATIENT
Start: 2025-01-02

## 2025-01-02 RX ORDER — ROSUVASTATIN CALCIUM 20 MG/1
20 TABLET, COATED ORAL NIGHTLY
Qty: 90 TABLET | Refills: 1 | Status: SHIPPED | OUTPATIENT
Start: 2025-01-02

## 2025-01-02 ASSESSMENT — ENCOUNTER SYMPTOMS
DYSURIA: 0
PALPITATIONS: 0
CHEST TIGHTNESS: 0
HEMATURIA: 0
ARTHRALGIAS: 0
LOSS OF SENSATION IN FEET: 0
FATIGUE: 0
FACIAL ASYMMETRY: 0
SEIZURES: 0
COUGH: 0
HEADACHES: 0
OCCASIONAL FEELINGS OF UNSTEADINESS: 0
ABDOMINAL PAIN: 0
DIZZINESS: 0
VOMITING: 0
POLYDIPSIA: 0
MYALGIAS: 0
BLOOD IN STOOL: 0
DIAPHORESIS: 0
FEVER: 0
DEPRESSION: 0
NAUSEA: 0
SPEECH DIFFICULTY: 0
CHILLS: 0
SHORTNESS OF BREATH: 0
POLYPHAGIA: 0

## 2025-01-02 ASSESSMENT — LIFESTYLE VARIABLES
SKIP TO QUESTIONS 9-10: 1
AUDIT-C TOTAL SCORE: 0
HOW OFTEN DO YOU HAVE SIX OR MORE DRINKS ON ONE OCCASION: NEVER
HOW OFTEN DURING THE LAST YEAR HAVE YOU HAD A FEELING OF GUILT OR REMORSE AFTER DRINKING: NEVER
HOW OFTEN DO YOU HAVE A DRINK CONTAINING ALCOHOL: NEVER
HOW MANY STANDARD DRINKS CONTAINING ALCOHOL DO YOU HAVE ON A TYPICAL DAY: PATIENT DOES NOT DRINK
HAS A RELATIVE, FRIEND, DOCTOR, OR ANOTHER HEALTH PROFESSIONAL EXPRESSED CONCERN ABOUT YOUR DRINKING OR SUGGESTED YOU CUT DOWN: NO
HOW OFTEN DURING THE LAST YEAR HAVE YOU FAILED TO DO WHAT WAS NORMALLY EXPECTED FROM YOU BECAUSE OF DRINKING: NEVER
HOW OFTEN DURING THE LAST YEAR HAVE YOU BEEN UNABLE TO REMEMBER WHAT HAPPENED THE NIGHT BEFORE BECAUSE YOU HAD BEEN DRINKING: NEVER
AUDIT TOTAL SCORE: 0
HAVE YOU OR SOMEONE ELSE BEEN INJURED AS A RESULT OF YOUR DRINKING: NO
HOW OFTEN DURING THE LAST YEAR HAVE YOU NEEDED AN ALCOHOLIC DRINK FIRST THING IN THE MORNING TO GET YOURSELF GOING AFTER A NIGHT OF HEAVY DRINKING: NEVER
HOW OFTEN DURING THE LAST YEAR HAVE YOU FOUND THAT YOU WERE NOT ABLE TO STOP DRINKING ONCE YOU HAD STARTED: NEVER

## 2025-01-02 ASSESSMENT — PAIN SCALES - GENERAL: PAINLEVEL_OUTOF10: 0-NO PAIN

## 2025-01-02 NOTE — PROGRESS NOTES
[facility]: [facility]  PROGRESS NOTE      Pro Fernandes is a 69 y.o. male that is presenting today for Establish Care.    Mr. Escalante reports taking diabetic medications as directed. He is trying to follow a low sugar and carbohydrate diet. He is not monitoring his home BG levels. Denies episodes of hyper or hypoglycemia, polyuria, polydipsia or polyphagia. A1c on 12/23/24 was 6.9 stable from previous 7.0 on 06/11/24.    He reports taking antihypertensives as directed. He is trying to follow a low sodium diet. He is monitoring home BP. Reports consistently 120's/70's. Denies CP, SOB, dizziness, syncope or HA.    Tolerating statin. Trying to follow a low cholesterol diet. Denies statin related abdominal pain, myalgias or arthralgias. Lipid Panel completed 12/23/24 showed LDL at goal at 58.    Assessment/Plan     Diagnoses and all orders for this visit:    Type 2 diabetes mellitus without complication, without long-term current use of insulin (Multi)  -     Stable, encouraged increased lifestyle modification efforts  -     farxiga 5 mg daily  -     metformin  mg daily  -     Comprehensive Metabolic Panel; Future  -     Lipid Panel; Future  -     Hemoglobin A1C; Future  -     Albumin-Creatinine Ratio, Urine Random; Future    Essential hypertension  -     excellent control  -     amlodipine 5 mg nightly  -     aspirin 81 mg daily  -     olmesartan 40 mg daily  -     CBC and Auto Differential; Future  -     Comprehensive Metabolic Panel; Future  -     Lipid Panel; Future    Hypercholesterolemia  -     tolerating statin  -     ASCVD Risk 32.5%  -     LDL at goal  -     rosuvastatin 20 mg nightly  -     Comprehensive Metabolic Panel; Future  -     Lipid Panel; Future    Other orders  -     Follow Up In Primary Care - Medicare Annual; Future    Subjective   HPI  Review of Systems   Constitutional:  Negative for chills, diaphoresis, fatigue and fever.   Respiratory:  Negative for cough,  chest tightness and shortness of breath.    Cardiovascular:  Negative for chest pain, palpitations and leg swelling.   Gastrointestinal:  Negative for abdominal pain, blood in stool, nausea and vomiting.   Endocrine: Negative for cold intolerance, heat intolerance, polydipsia, polyphagia and polyuria.   Genitourinary:  Negative for dysuria and hematuria.   Musculoskeletal:  Negative for arthralgias and myalgias.   Neurological:  Negative for dizziness, seizures, syncope, facial asymmetry, speech difficulty and headaches.      Objective   Vitals:    01/02/25 0931   BP: 126/82   Pulse: 79   Temp: 36.4 °C (97.6 °F)   SpO2: 96%      Body mass index is 32.78 kg/m².  Physical Exam  Constitutional:       General: He is not in acute distress.     Appearance: He is normal weight.   Neck:      Vascular: No carotid bruit.   Cardiovascular:      Rate and Rhythm: Normal rate and regular rhythm.      Heart sounds: Normal heart sounds. No murmur heard.  Pulmonary:      Effort: Pulmonary effort is normal.      Breath sounds: Normal breath sounds.   Abdominal:      General: Bowel sounds are normal. There is no distension.      Palpations: Abdomen is soft.      Tenderness: There is no abdominal tenderness. There is no right CVA tenderness or left CVA tenderness.   Musculoskeletal:      Cervical back: Neck supple. No tenderness.   Lymphadenopathy:      Cervical: No cervical adenopathy.   Skin:     General: Skin is warm and dry.   Neurological:      General: No focal deficit present.      Mental Status: He is alert. Mental status is at baseline.   Psychiatric:         Mood and Affect: Mood normal.         Behavior: Behavior normal.       Diagnostic Results   Lab Results   Component Value Date    GLUCOSE 155 (H) 12/23/2024    CALCIUM 9.3 12/23/2024     12/23/2024    K 4.2 12/23/2024    CO2 29 12/23/2024     12/23/2024    BUN 19 12/23/2024    CREATININE 1.25 12/23/2024     Lab Results   Component Value Date    ALT 30  "12/23/2024    AST 20 12/23/2024    ALKPHOS 86 12/23/2024    BILITOT 0.5 12/23/2024     Lab Results   Component Value Date    WBC 5.2 12/23/2024    HGB 14.1 12/23/2024    HCT 43.4 12/23/2024    MCV 90 12/23/2024     12/23/2024     Lab Results   Component Value Date    CHOL 142 12/23/2024    CHOL 142 06/11/2024    CHOL 157 10/20/2023     Lab Results   Component Value Date    HDL 51.7 12/23/2024    HDL 53.0 06/11/2024    HDL 53.0 10/20/2023     Lab Results   Component Value Date    LDLCALC 58 12/23/2024    LDLCALC 58 (L) 06/11/2024    LDLCALC 66 10/20/2023     Lab Results   Component Value Date    TRIG 162 (H) 12/23/2024    TRIG 157 (H) 06/11/2024    TRIG 190 (H) 10/20/2023     No components found for: \"CHOLHDL\"  Lab Results   Component Value Date    HGBA1C 6.9 (H) 12/23/2024     Other labs not included in the list above were reviewed either before or during this encounter.    History    Past Medical History:   Diagnosis Date    Age-related nuclear cataract, bilateral     Arteriosclerosis of coronary artery 08/27/2023    Arteriosclerosis of coronary artery 08/27/2023    Dr.Raj Chen.    Dry eyes     Essential hypertension 08/27/2023    History of malignant neoplasm of prostate 10/31/2023    9/11/23 prostate removed, DR.Jeff Malin.    History of prostate cancer 10/31/2023    9/11/23 prostate removed, DR.Jeff Malin.    Hypercholesterolemia 08/27/2023    Refractive error      Past Surgical History:   Procedure Laterality Date    BASAL CELL CARCINOMA EXCISION      face 12/2024    PROSTATE SURGERY       Family History   Problem Relation Name Age of Onset    Mental illness Mother Giselle Fernandes     Stroke Mother Giselle Fernandes     Mental illness Father Crow Fernandes     Cancer Father Crow Fernandes     Cancer Brother Jeremiah Fernandes      Social History     Socioeconomic History    Marital status:      Spouse name: Not on file    Number of children: Not on file    Years of education: Not on file    Highest education " level: Not on file   Occupational History    Not on file   Tobacco Use    Smoking status: Never     Passive exposure: Past    Smokeless tobacco: Never   Vaping Use    Vaping status: Never Used   Substance and Sexual Activity    Alcohol use: Not Currently     Alcohol/week: 1.0 standard drink of alcohol     Types: 1 Cans of beer per week    Drug use: Never    Sexual activity: Not Currently     Partners: Female   Other Topics Concern    Not on file   Social History Narrative    Not on file     Social Drivers of Health     Financial Resource Strain: Not on file   Food Insecurity: Not on file   Transportation Needs: Not on file   Physical Activity: Not on file   Stress: Not on file   Social Connections: Not on file   Intimate Partner Violence: Not on file   Housing Stability: Not on file     Allergies   Allergen Reactions    Atorvastatin Other     Myalgia     Fluvastatin Other     Myalgia     Lisinopril Cough     Current Outpatient Medications on File Prior to Visit   Medication Sig Dispense Refill    amLODIPine (Norvasc) 5 mg tablet TAKE 1 TABLET BY MOUTH EVERYDAY AT BEDTIME 90 tablet 1    aspirin 81 mg chewable tablet Chew 1 tablet (81 mg) once daily at bedtime.      dapagliflozin propanediol (Farxiga) 5 mg Take 1 tablet (5 mg) by mouth once daily. 30 tablet 8    metFORMIN  mg 24 hr tablet TAKE 2 TABLETS BY MOUTH EVERY DAY WITH EVENING MEAL 180 tablet 1    olmesartan (BENIcar) 40 mg tablet TAKE 1 TABLET BY MOUTH EVERY DAY AT BEDTIME FOR 90 DAYS 90 tablet 2    rosuvastatin (Crestor) 20 mg tablet TAKE 1 TABLET BY MOUTH EVERY DAY AT BEDTIME 90 tablet 1    sildenafil (Viagra) 100 mg tablet TAKE 1/2 TO 1 TABLET BY MOUTH ONCE DAILY AS NEEDED FOR UP TO 90 DAYS 20 tablet 0    [DISCONTINUED] Eliquis 2.5 mg tablet Take 1 tablet (2.5 mg) by mouth 2 times a day.       No current facility-administered medications on file prior to visit.     Immunization History   Administered Date(s) Administered    Flu vaccine (IIV4),  preservative free *Check age/dose* 09/14/2017, 09/23/2019    Flu vaccine, quadrivalent, high-dose, preservative free, age 65y+ (FLUZONE) 09/12/2023    Flu vaccine, trivalent, preservative free, HIGH-DOSE, age 65y+ (Fluzone) 09/18/2021, 09/14/2024    Flu vaccine, trivalent, preservative free, age 6 months and greater (Fluarix/Fluzone/Flulaval) 09/25/2015, 10/04/2016    Influenza, Seasonal, Quadrivalent, Adjuvanted 10/02/2020, 09/30/2022    Influenza, seasonal, injectable 11/12/2010, 09/17/2011, 10/01/2012, 10/05/2013, 10/10/2014    Influenza, trivalent, adjuvanted 09/29/2018    Moderna SARS-CoV-2 Vaccination 02/11/2021, 03/16/2021, 11/19/2021    Pneumococcal conjugate vaccine, 13-valent (PREVNAR 13) 05/10/2018    Pneumococcal conjugate vaccine, 20-valent (PREVNAR 20) 06/09/2023    Pneumococcal polysaccharide vaccine, 23-valent, age 2 years and older (PNEUMOVAX 23) 05/23/2019    Tdap vaccine, age 7 year and older (BOOSTRIX, ADACEL) 05/31/2011, 05/20/2022    Zoster vaccine, recombinant, adult (SHINGRIX) 06/10/2021, 08/12/2021     Patient's medical history was reviewed and updated either before or during this encounter.       Vanessa Quintanilla, APRN-CNP

## 2025-02-04 DIAGNOSIS — I10 ESSENTIAL HYPERTENSION: ICD-10-CM

## 2025-02-04 RX ORDER — OLMESARTAN MEDOXOMIL 40 MG/1
40 TABLET ORAL NIGHTLY
Qty: 90 TABLET | Refills: 3 | Status: SHIPPED | OUTPATIENT
Start: 2025-02-04

## 2025-02-27 DIAGNOSIS — I25.10 ARTERIOSCLEROSIS OF CORONARY ARTERY: ICD-10-CM

## 2025-02-27 DIAGNOSIS — E11.9 TYPE 2 DIABETES MELLITUS WITHOUT COMPLICATION, WITHOUT LONG-TERM CURRENT USE OF INSULIN (MULTI): ICD-10-CM

## 2025-02-27 RX ORDER — DAPAGLIFLOZIN 5 MG/1
5 TABLET, FILM COATED ORAL DAILY
Qty: 30 TABLET | Refills: 5 | Status: SHIPPED | OUTPATIENT
Start: 2025-02-27

## 2025-02-27 NOTE — TELEPHONE ENCOUNTER
Vanessa das   NV:07/10/2025    LV: 01/02/2025  Last filled 6/17/2024 by  dr. Graham 30 tabs, 8 refills

## 2025-04-12 DIAGNOSIS — I10 ESSENTIAL HYPERTENSION: ICD-10-CM

## 2025-04-14 RX ORDER — AMLODIPINE BESYLATE 5 MG/1
5 TABLET ORAL NIGHTLY
Qty: 90 TABLET | Refills: 3 | Status: SHIPPED | OUTPATIENT
Start: 2025-04-14

## 2025-07-10 ENCOUNTER — APPOINTMENT (OUTPATIENT)
Dept: PRIMARY CARE | Facility: CLINIC | Age: 70
End: 2025-07-10
Payer: MEDICARE

## 2025-08-27 ENCOUNTER — OFFICE VISIT (OUTPATIENT)
Dept: OPHTHALMOLOGY | Facility: CLINIC | Age: 70
End: 2025-08-27
Payer: MEDICARE

## 2025-08-27 DIAGNOSIS — C44.1192 BASAL CELL CARCINOMA (BCC) OF LEFT LOWER EYELID: ICD-10-CM

## 2025-08-27 DIAGNOSIS — H25.13 AGE-RELATED NUCLEAR CATARACT OF BOTH EYES: ICD-10-CM

## 2025-08-27 DIAGNOSIS — H35.3131 EARLY DRY STAGE NONEXUDATIVE AGE-RELATED MACULAR DEGENERATION OF BOTH EYES: Primary | ICD-10-CM

## 2025-08-27 DIAGNOSIS — E11.9 TYPE 2 DIABETES MELLITUS WITHOUT COMPLICATION, WITHOUT LONG-TERM CURRENT USE OF INSULIN: ICD-10-CM

## 2025-08-27 DIAGNOSIS — H52.7 DISORDER OF REFRACTION: ICD-10-CM

## 2025-08-27 PROCEDURE — 92134 CPTRZ OPH DX IMG PST SGM RTA: CPT | Performed by: OPHTHALMOLOGY

## 2025-08-27 PROCEDURE — 99214 OFFICE O/P EST MOD 30 MIN: CPT | Performed by: OPHTHALMOLOGY

## 2025-08-27 ASSESSMENT — PATIENT HEALTH QUESTIONNAIRE - PHQ9
2. FEELING DOWN, DEPRESSED OR HOPELESS: NOT AT ALL
SUM OF ALL RESPONSES TO PHQ9 QUESTIONS 1 AND 2: 0
1. LITTLE INTEREST OR PLEASURE IN DOING THINGS: NOT AT ALL

## 2025-08-27 ASSESSMENT — EXTERNAL EXAM - RIGHT EYE: OD_EXAM: NORMAL

## 2025-08-27 ASSESSMENT — TONOMETRY
IOP_METHOD: GOLDMANN APPLANATION
OS_IOP_MMHG: 14
OD_IOP_MMHG: 16

## 2025-08-27 ASSESSMENT — ENCOUNTER SYMPTOMS
NEUROLOGICAL NEGATIVE: 0
EYES NEGATIVE: 0
CONSTITUTIONAL NEGATIVE: 0
ALLERGIC/IMMUNOLOGIC NEGATIVE: 0
ENDOCRINE NEGATIVE: 0
CARDIOVASCULAR NEGATIVE: 0
RESPIRATORY NEGATIVE: 0
GASTROINTESTINAL NEGATIVE: 0
MUSCULOSKELETAL NEGATIVE: 0
PSYCHIATRIC NEGATIVE: 0
HEMATOLOGIC/LYMPHATIC NEGATIVE: 0

## 2025-08-27 ASSESSMENT — CUP TO DISC RATIO
OS_RATIO: 0.1
OD_RATIO: 0.1

## 2025-08-27 ASSESSMENT — PAIN SCALES - GENERAL: PAINLEVEL_OUTOF10: 0-NO PAIN

## 2025-08-27 ASSESSMENT — REFRACTION_WEARINGRX
SPECS_TYPE: READERS
OD_SPHERE: +2.75
OS_SPHERE: +2.75

## 2025-08-27 ASSESSMENT — SLIT LAMP EXAM - LIDS: COMMENTS: NORMAL

## 2025-08-27 ASSESSMENT — EXTERNAL EXAM - LEFT EYE: OS_EXAM: NORMAL

## 2025-08-27 ASSESSMENT — VISUAL ACUITY
METHOD: SNELLEN - LINEAR
OS_SC: 20/30
OD_SC: 20/25

## 2026-08-31 ENCOUNTER — APPOINTMENT (OUTPATIENT)
Dept: OPHTHALMOLOGY | Facility: CLINIC | Age: 71
End: 2026-08-31
Payer: MEDICARE